# Patient Record
Sex: MALE | URBAN - METROPOLITAN AREA
[De-identification: names, ages, dates, MRNs, and addresses within clinical notes are randomized per-mention and may not be internally consistent; named-entity substitution may affect disease eponyms.]

---

## 2019-10-18 ENCOUNTER — ACNE/ROSACEA (OUTPATIENT)
Dept: URBAN - METROPOLITAN AREA CLINIC 31 | Facility: CLINIC | Age: 29
Setting detail: DERMATOLOGY
End: 2019-10-18

## 2019-10-18 PROCEDURE — 11900 INJECT SKIN LESIONS </W 7: CPT

## 2019-10-18 PROCEDURE — 99202 OFFICE O/P NEW SF 15 MIN: CPT

## 2020-11-03 ENCOUNTER — SEE NOTE (OUTPATIENT)
Dept: URBAN - METROPOLITAN AREA CLINIC 31 | Facility: CLINIC | Age: 30
Setting detail: DERMATOLOGY
End: 2020-11-03

## 2020-11-03 ENCOUNTER — RX ONLY (RX ONLY)
Age: 30
End: 2020-11-03

## 2020-11-03 DIAGNOSIS — L57.0 ACTINIC KERATOSIS: ICD-10-CM

## 2020-11-03 PROBLEM — L73.8 OTHER SPECIFIED FOLLICULAR DISORDERS: Status: ACTIVE | Noted: 2020-11-03

## 2020-11-03 PROBLEM — L73.8 OTHER SPECIFIED FOLLICULAR DISORDERS: Status: RESOLVED | Noted: 2020-11-03

## 2020-11-03 PROCEDURE — 11900 INJECT SKIN LESIONS </W 7: CPT

## 2020-11-03 PROCEDURE — 99213 OFFICE O/P EST LOW 20 MIN: CPT

## 2020-11-03 RX ORDER — DOXYCYCLINE 100 MG/1
1 TABLET TABLET, FILM COATED ORAL BID
Qty: 14 | Refills: 0
Start: 2020-11-03

## 2021-07-12 ENCOUNTER — OFFICE VISIT (OUTPATIENT)
Dept: URBAN - METROPOLITAN AREA CLINIC 98 | Facility: CLINIC | Age: 31
End: 2021-07-12
Payer: COMMERCIAL

## 2021-07-12 ENCOUNTER — WEB ENCOUNTER (OUTPATIENT)
Dept: URBAN - METROPOLITAN AREA CLINIC 98 | Facility: CLINIC | Age: 31
End: 2021-07-12

## 2021-07-12 VITALS
SYSTOLIC BLOOD PRESSURE: 128 MMHG | WEIGHT: 201.4 LBS | HEART RATE: 71 BPM | DIASTOLIC BLOOD PRESSURE: 77 MMHG | HEIGHT: 73 IN | BODY MASS INDEX: 26.69 KG/M2 | TEMPERATURE: 97.8 F

## 2021-07-12 DIAGNOSIS — K21.9 GERD WITHOUT ESOPHAGITIS: ICD-10-CM

## 2021-07-12 DIAGNOSIS — R13.19 ESOPHAGEAL DYSPHAGIA: ICD-10-CM

## 2021-07-12 PROBLEM — 266435005: Status: ACTIVE | Noted: 2021-07-12

## 2021-07-12 PROBLEM — 235595009 GASTROESOPHAGEAL REFLUX DISEASE: Status: ACTIVE | Noted: 2021-07-12

## 2021-07-12 PROCEDURE — 99244 OFF/OP CNSLTJ NEW/EST MOD 40: CPT | Performed by: INTERNAL MEDICINE

## 2021-07-12 RX ORDER — FAMOTIDINE 40 MG/1
1 TABLET AT BEDTIME TABLET, FILM COATED ORAL ONCE A DAY
OUTPATIENT

## 2021-07-12 RX ORDER — OMEPRAZOLE 40 MG/1
1 CAPSULE 30 MINUTES BEFORE MORNING MEAL CAPSULE, DELAYED RELEASE ORAL ONCE A DAY
OUTPATIENT

## 2021-07-12 RX ORDER — CETIRIZINE HYDROCHLORIDE 10 MG/1
1 TABLET TABLET, FILM COATED ORAL ONCE A DAY
Status: ACTIVE | COMMUNITY

## 2021-07-12 RX ORDER — FAMOTIDINE 40 MG/1
1 TABLET AT BEDTIME TABLET, FILM COATED ORAL ONCE A DAY
Status: ACTIVE | COMMUNITY

## 2021-07-12 RX ORDER — OMEPRAZOLE 40 MG/1
1 CAPSULE 30 MINUTES BEFORE MORNING MEAL CAPSULE, DELAYED RELEASE ORAL ONCE A DAY
Status: ACTIVE | COMMUNITY

## 2021-07-12 RX ORDER — DEXTROAMPHETAMINE SULFATE, DEXTROAMPHETAMINE SACCHARATE, AMPHETAMINE SULFATE AND AMPHETAMINE ASPARTATE 5; 5; 5; 5 MG/1; MG/1; MG/1; MG/1
1 CAPSULE IN THE MORNING CAPSULE, EXTENDED RELEASE ORAL ONCE A DAY
Status: ACTIVE | COMMUNITY

## 2021-07-12 NOTE — HPI-TODAY'S VISIT:
Mr. Groves is a 29 yo M presenting with chest pain and is referred by Dr. Chinmay Worrell. A copy of this report will be sent to Dr. Worrell.   He was feeling nasal drip/mucus in the back of his throat since December 2020. It was getting worse and he went to see ENT 2 weeks ago. He had a laryngoscopy and was told he had silent reflux. He was started on omeprazole 40 mg daily and famotidine 20 mg after that visit. Since starting these medications he started to feel dizzy. He then started to have chest pain and went to [facility] on July 6. He was told at the ED that he had heartburn. He takes Adderall and had caffeine which makes his symptoms worse.   Caffeine and alcohol make the symptoms worse. They cause regurgitation and vomiting.  He has had 2 episodes of dysphagia to solid food in the last month, mostly spicy Chex Mix.  No weight loss recently.   He has a history of allergies.  Family history of EoE.

## 2021-07-14 ENCOUNTER — TELEPHONE ENCOUNTER (OUTPATIENT)
Dept: URBAN - METROPOLITAN AREA CLINIC 98 | Facility: CLINIC | Age: 31
End: 2021-07-14

## 2021-07-21 ENCOUNTER — OFFICE VISIT (OUTPATIENT)
Dept: URBAN - METROPOLITAN AREA SURGERY CENTER 18 | Facility: SURGERY CENTER | Age: 31
End: 2021-07-21
Payer: COMMERCIAL

## 2021-07-21 DIAGNOSIS — K21.00 ALKALINE REFLUX ESOPHAGITIS: ICD-10-CM

## 2021-07-21 PROCEDURE — 43239 EGD BIOPSY SINGLE/MULTIPLE: CPT | Performed by: INTERNAL MEDICINE

## 2021-07-21 PROCEDURE — G8907 PT DOC NO EVENTS ON DISCHARG: HCPCS | Performed by: INTERNAL MEDICINE

## 2021-07-21 RX ORDER — DEXTROAMPHETAMINE SULFATE, DEXTROAMPHETAMINE SACCHARATE, AMPHETAMINE SULFATE AND AMPHETAMINE ASPARTATE 5; 5; 5; 5 MG/1; MG/1; MG/1; MG/1
1 CAPSULE IN THE MORNING CAPSULE, EXTENDED RELEASE ORAL ONCE A DAY
Status: ACTIVE | COMMUNITY

## 2021-07-21 RX ORDER — CETIRIZINE HYDROCHLORIDE 10 MG/1
1 TABLET TABLET, FILM COATED ORAL ONCE A DAY
Status: ACTIVE | COMMUNITY

## 2021-07-21 RX ORDER — FAMOTIDINE 40 MG/1
1 TABLET AT BEDTIME TABLET, FILM COATED ORAL ONCE A DAY
Status: ACTIVE | COMMUNITY

## 2021-07-27 ENCOUNTER — OFFICE VISIT (OUTPATIENT)
Dept: URBAN - METROPOLITAN AREA CLINIC 96 | Facility: CLINIC | Age: 31
End: 2021-07-27

## 2021-08-06 ENCOUNTER — OFFICE VISIT (OUTPATIENT)
Dept: URBAN - METROPOLITAN AREA CLINIC 98 | Facility: CLINIC | Age: 31
End: 2021-08-06
Payer: COMMERCIAL

## 2021-08-06 ENCOUNTER — DASHBOARD ENCOUNTERS (OUTPATIENT)
Age: 31
End: 2021-08-06

## 2021-08-06 VITALS — HEIGHT: 73 IN | WEIGHT: 200 LBS | BODY MASS INDEX: 26.51 KG/M2

## 2021-08-06 DIAGNOSIS — R13.19 ESOPHAGEAL DYSPHAGIA: ICD-10-CM

## 2021-08-06 DIAGNOSIS — K21.00 GASTROESOPHAGEAL REFLUX DISEASE WITH ESOPHAGITIS WITHOUT HEMORRHAGE: ICD-10-CM

## 2021-08-06 PROCEDURE — 99213 OFFICE O/P EST LOW 20 MIN: CPT | Performed by: INTERNAL MEDICINE

## 2021-08-06 RX ORDER — FAMOTIDINE 40 MG/1
1 TABLET AT BEDTIME TABLET, FILM COATED ORAL ONCE A DAY
Status: ACTIVE | COMMUNITY

## 2021-08-06 RX ORDER — CETIRIZINE HYDROCHLORIDE 10 MG/1
1 TABLET TABLET, FILM COATED ORAL ONCE A DAY
Status: ACTIVE | COMMUNITY

## 2021-08-06 RX ORDER — DEXTROAMPHETAMINE SULFATE, DEXTROAMPHETAMINE SACCHARATE, AMPHETAMINE SULFATE AND AMPHETAMINE ASPARTATE 5; 5; 5; 5 MG/1; MG/1; MG/1; MG/1
1 CAPSULE IN THE MORNING CAPSULE, EXTENDED RELEASE ORAL ONCE A DAY
Status: ACTIVE | COMMUNITY

## 2021-08-06 RX ORDER — FAMOTIDINE 40 MG/1
1 TABLET AT BEDTIME TABLET, FILM COATED ORAL ONCE A DAY
Qty: 30 | Refills: 2

## 2021-08-06 NOTE — HPI-TODAY'S VISIT:
He has been feeling better since his EGD. He was taking omeprazole and famotidine after his EGD through yesterday. He has not had any heartburn, dysphagia, or odynophagia since his EGD.  No weight change, no nausea or vomiting.   I personally reviewed: 21 EGD: normal. biopsies in the esophagus 21 EGD path: max 10 eos/HPF in lower esophagus, reflux esophagitis   Patient seen today via telehealth by agreement and consent of patient in light of current COVID-19 pandemic. I used video conferencing during the visit. The patient encounter is appropriate and reasonable under the circumstances given the patient's particular presentation at this time. The patient has been advised of the followin) the potential risks and limitations of this mode of treatment (including but not limited to the absence of in-person examination); 2) the right to refuse telehealth services at any point without affecting the right to future care; 3) the right to receive in-person services, included immediately after this consultation if an urgent need arises; 4) information, including identifiable images or information from this telehealth consult, will only be shared in accordance with HIPPA regulations. Any and all of the patient's and/or patient's family member's questions on this issue have been answered. The patient has verbally consented to be treated via telehealth services. The patient has also been advised to contact this office for worsening conditions or problems, and seek emergency medical treatment and/or call 911 if the patient deems either necessary.   More than half of the face-to-face time used for counseling and coordination of care.

## 2021-12-17 ENCOUNTER — ACNE/ROSACEA (OUTPATIENT)
Dept: URBAN - METROPOLITAN AREA CLINIC 31 | Facility: CLINIC | Age: 31
Setting detail: DERMATOLOGY
End: 2021-12-17

## 2021-12-17 DIAGNOSIS — D22.5 MELANOCYTIC NEVI OF TRUNK: ICD-10-CM

## 2021-12-17 PROBLEM — L73.8 OTHER SPECIFIED FOLLICULAR DISORDERS: Status: ACTIVE | Noted: 2021-12-17

## 2021-12-17 PROBLEM — L73.8 OTHER SPECIFIED FOLLICULAR DISORDERS: Status: RESOLVED | Noted: 2021-12-17

## 2021-12-17 PROCEDURE — 99214 OFFICE O/P EST MOD 30 MIN: CPT

## 2021-12-21 ENCOUNTER — RX ONLY (RX ONLY)
Age: 31
End: 2021-12-21

## 2021-12-21 RX ORDER — CLINDAMYCIN PHOSPHATE AND TRETINOIN 10; .25 MG/G; MG/G
1 A SMALL AMOUNT GEL TOPICAL AT BEDTIME
Qty: 30 | Refills: 6
Start: 2021-12-21

## 2022-06-23 ENCOUNTER — WORRISOME GROWTH - SEE NOTE (OUTPATIENT)
Dept: URBAN - METROPOLITAN AREA CLINIC 32 | Facility: CLINIC | Age: 32
Setting detail: DERMATOLOGY
End: 2022-06-23

## 2022-06-23 DIAGNOSIS — Z79.899 OTHER LONG-TERM (CURRENT) DRUG THERAPY: ICD-10-CM

## 2022-06-23 PROCEDURE — 11900 INJECT SKIN LESIONS </W 7: CPT
